# Patient Record
Sex: FEMALE
[De-identification: names, ages, dates, MRNs, and addresses within clinical notes are randomized per-mention and may not be internally consistent; named-entity substitution may affect disease eponyms.]

---

## 2017-11-17 ENCOUNTER — HOSPITAL ENCOUNTER (EMERGENCY)
Dept: HOSPITAL 31 - C.ER | Age: 32
Discharge: HOME | End: 2017-11-17
Payer: MEDICAID

## 2017-11-17 VITALS — TEMPERATURE: 97.4 F | HEART RATE: 69 BPM | SYSTOLIC BLOOD PRESSURE: 110 MMHG | DIASTOLIC BLOOD PRESSURE: 74 MMHG

## 2017-11-17 VITALS — RESPIRATION RATE: 18 BRPM

## 2017-11-17 VITALS — OXYGEN SATURATION: 100 %

## 2017-11-17 DIAGNOSIS — R10.30: ICD-10-CM

## 2017-11-17 DIAGNOSIS — I10: ICD-10-CM

## 2017-11-17 DIAGNOSIS — R19.7: Primary | ICD-10-CM

## 2017-11-17 LAB
ALBUMIN/GLOB SERPL: 1.6 {RATIO} (ref 1–2.1)
ALP SERPL-CCNC: 58 U/L (ref 38–126)
ALT SERPL-CCNC: 41 U/L (ref 9–52)
AST SERPL-CCNC: 28 U/L (ref 14–36)
BASOPHILS # BLD AUTO: 0 K/UL (ref 0–0.2)
BASOPHILS NFR BLD: 0.3 % (ref 0–2)
BILIRUB SERPL-MCNC: 0.8 MG/DL (ref 0.2–1.3)
BILIRUB UR-MCNC: NEGATIVE MG/DL
BUN SERPL-MCNC: 10 MG/DL (ref 7–17)
CALCIUM SERPL-MCNC: 8.3 MG/DL (ref 8.6–10.4)
CHLORIDE SERPL-SCNC: 103 MMOL/L (ref 98–107)
CO2 SERPL-SCNC: 25 MMOL/L (ref 22–30)
EOSINOPHIL # BLD AUTO: 0.1 K/UL (ref 0–0.7)
EOSINOPHIL NFR BLD: 1.8 % (ref 0–4)
ERYTHROCYTE [DISTWIDTH] IN BLOOD BY AUTOMATED COUNT: 13.5 % (ref 11.5–14.5)
GLOBULIN SER-MCNC: 2.5 GM/DL (ref 2.2–3.9)
GLUCOSE SERPL-MCNC: 78 MG/DL (ref 65–105)
GLUCOSE UR STRIP-MCNC: NORMAL MG/DL
HCT VFR BLD CALC: 39.4 % (ref 34–47)
KETONES UR STRIP-MCNC: NEGATIVE MG/DL
LEUKOCYTE ESTERASE UR-ACNC: (no result) LEU/UL
LYMPHOCYTES # BLD AUTO: 1.8 K/UL (ref 1–4.3)
LYMPHOCYTES NFR BLD AUTO: 31.8 % (ref 20–40)
MCH RBC QN AUTO: 29 PG (ref 27–31)
MCHC RBC AUTO-ENTMCNC: 33.6 G/DL (ref 33–37)
MCV RBC AUTO: 86.3 FL (ref 81–99)
MONOCYTES # BLD: 0.4 K/UL (ref 0–0.8)
MONOCYTES NFR BLD: 6.8 % (ref 0–10)
NRBC BLD AUTO-RTO: 0.1 % (ref 0–2)
PH UR STRIP: 5 [PH] (ref 5–8)
PLATELET # BLD: 280 K/UL (ref 130–400)
PMV BLD AUTO: 9.2 FL (ref 7.2–11.7)
POTASSIUM SERPL-SCNC: 4 MMOL/L (ref 3.6–5.2)
PROT SERPL-MCNC: 6.5 G/DL (ref 6.3–8.3)
PROT UR STRIP-MCNC: NEGATIVE MG/DL
RBC # UR STRIP: NEGATIVE /UL
RBC #/AREA URNS HPF: < 1 /HPF (ref 0–3)
SODIUM SERPL-SCNC: 137 MMOL/L (ref 132–148)
SP GR UR STRIP: 1.02 (ref 1–1.03)
UROBILINOGEN UR-MCNC: NORMAL MG/DL (ref 0.2–1)
WBC # BLD AUTO: 5.6 K/UL (ref 4.8–10.8)
WBC #/AREA URNS HPF: < 1 /HPF (ref 0–5)

## 2017-11-17 PROCEDURE — 96375 TX/PRO/DX INJ NEW DRUG ADDON: CPT

## 2017-11-17 PROCEDURE — 96374 THER/PROPH/DIAG INJ IV PUSH: CPT

## 2017-11-17 PROCEDURE — 83690 ASSAY OF LIPASE: CPT

## 2017-11-17 PROCEDURE — 99285 EMERGENCY DEPT VISIT HI MDM: CPT

## 2017-11-17 PROCEDURE — 85025 COMPLETE CBC W/AUTO DIFF WBC: CPT

## 2017-11-17 PROCEDURE — 81001 URINALYSIS AUTO W/SCOPE: CPT

## 2017-11-17 PROCEDURE — 80053 COMPREHEN METABOLIC PANEL: CPT

## 2017-11-17 PROCEDURE — 96361 HYDRATE IV INFUSION ADD-ON: CPT

## 2017-11-17 PROCEDURE — 84703 CHORIONIC GONADOTROPIN ASSAY: CPT

## 2017-11-17 NOTE — C.PDOC
History Of Present Illness





33 y/o female presents to ED with c/o lower abdominal pain, which she states 

radiates to the back, and described as cramping. Patient reports pain is 

associated with diarrhea. She reports she is having several episodes of non-

bloody diarrhea per day, and has diarrhea after she eats. Denies fever, vomiting

, dysuria, rash, recent travel. 


Time Seen by Provider: 11/17/17 09:28


Chief Complaint (Nursing): Abdominal Pain


History Per: Patient


History/Exam Limitations: no limitations


Onset/Duration Of Symptoms: Days


Current Symptoms Are (Timing): Still Present


Location Of Pain/Discomfort: RLQ, LLQ


Radiation Of Pain To:: None


Quality Of Discomfort: "Pain"


Associated Symptoms: Diarrhea.  denies: Fever, Chills, Vomiting, Urinary 

Symptoms


Recent travel outside of the [place]: No





Past Medical History


Reviewed: Historical Data, Nursing Documentation, Vital Signs


Vital Signs: 


 Last Vital Signs











Temp  97.9 F   11/17/17 12:26


 


Pulse  63   11/17/17 12:26


 


Resp  18   11/17/17 12:26


 


BP  96/68 L  11/17/17 12:26


 


Pulse Ox  100   11/17/17 12:26














- Medical History


PMH: HTN


Family History: States: Unknown Family Hx





- Social History


Hx Tobacco Use: No


Hx Alcohol Use: No


Hx Substance Use: No





- Immunization History


Hx Tetanus Toxoid Vaccination: No


Hx Influenza Vaccination: No





Review Of Systems


Except As Marked, All Systems Reviewed And Found Negative.


Constitutional: Negative for: Fever


Cardiovascular: Negative for: Chest Pain, Palpitations


Respiratory: Negative for: Cough, Shortness of Breath, Wheezing


Gastrointestinal: Positive for: Abdominal Pain, Diarrhea.  Negative for: 

Vomiting


Skin: Negative for: Rash


Neurological: Negative for: Headache, Dizziness





Physical Exam





- Physical Exam


Appears: Non-toxic, No Acute Distress


Skin: Normal Color, Warm, Dry


Head: Atraumatic, Normacephalic


Oral Mucosa: Moist


Chest: Symmetrical


Cardiovascular: Rhythm Regular


Respiratory: Normal Breath Sounds, No Rales, No Rhonchi, No Wheezing


Gastrointestinal/Abdominal: Soft, No Tenderness, No Guarding, No Rebound


Back: Normal Inspection, No CVA Tenderness


Extremity: Normal ROM, Capillary Refill (< 2 sec.)


Neurological/Psych: Oriented x3, Normal Speech, Normal Cognition





ED Course And Treatment





- Laboratory Results


Result Diagrams: 


 11/17/17 10:27





 11/17/17 10:27


O2 Sat by Pulse Oximetry: 100 (RA)


Pulse Ox Interpretation: Normal


Progress Note: Pepcid, Toradol, Zofran, and IVFs. Labs ordered.





Disposition





- Disposition


Referrals: 


Dawood Crowe MD [Staff Provider] - 


Disposition: HOME/ ROUTINE


Disposition Time: 12:57


Condition: GOOD


Additional Instructions: 


Follow up with the medical doctor within 1-2 days. Return if worsened. 


Prescriptions: 


Ciprofloxacin HCl [Cipro] 500 mg PO BID #19 tab


Ibuprofen [Motrin] 1 tab PO TID PRN #30 tab


 PRN Reason: Pain


metroNIDAZOLE [Flagyl] 500 mg PO BID #14 tab


Instructions:  Acute Diarrhea (ED)


Forms:  [facility] (English)





- Clinical Impression


Clinical Impression: 


 Diarrhea, Abdominal pain








- PA / NP / Resident Statement


MD/DO has reviewed & agrees with the documentation as recorded.





- Scribe Statement


The provider has reviewed the documentation as recorded by the Scribe





SM





All medical record entries made by the Scribe were at my direction and 

personally dictated by me. I have reviewed the chart and agree that the record 

accurately reflects my personal performance of the history, physical exam, 

medical decision making, and the department course for this patient. I have 

also personally directed, reviewed, and agree with the discharge instructions 

and disposition.

## 2018-04-16 ENCOUNTER — HOSPITAL ENCOUNTER (EMERGENCY)
Dept: HOSPITAL 31 - C.ER | Age: 33
Discharge: HOME | End: 2018-04-16
Payer: SELF-PAY

## 2018-04-16 VITALS
DIASTOLIC BLOOD PRESSURE: 78 MMHG | TEMPERATURE: 98.2 F | HEART RATE: 80 BPM | SYSTOLIC BLOOD PRESSURE: 124 MMHG | RESPIRATION RATE: 18 BRPM

## 2018-04-16 VITALS — BODY MASS INDEX: 31.1 KG/M2

## 2018-04-16 VITALS — OXYGEN SATURATION: 100 %
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
## 2018-04-16 DIAGNOSIS — S93.505A: Primary | ICD-10-CM

## 2018-04-16 NOTE — C.PDOC
History Of Present Illness


31 y/o female presents to ED with complaints of pain and swelling to left pinky 

toe after hitting toe on furniture 4 days ago. Patient denies numbness, fever, 

draining, nausea, vomiting or any other complaints at this time.


Time Seen by Provider: 04/16/18 10:34


Chief Complaint (Nursing): Lower Extremity Problem/Injury


History Per: Patient


History/Exam Limitations: no limitations


Onset/Duration Of Symptoms: Days


Current Symptoms Are (Timing): Still Present





Past Medical History


Reviewed: Historical Data, Nursing Documentation, Vital Signs


Vital Signs: 


 Last Vital Signs











Temp  98.2 F   04/16/18 12:04


 


Pulse  80   04/16/18 12:04


 


Resp  18   04/16/18 12:04


 


BP  124/78   04/16/18 12:04


 


Pulse Ox  100   04/16/18 12:04














- Medical History


PMH: HTN


Surgical History: No Surg Hx


Family History: States: No Known Family Hx





- Social History


Hx Tobacco Use: No


Hx Alcohol Use: No


Hx Substance Use: No





- Immunization History


Hx Tetanus Toxoid Vaccination: No


Hx Influenza Vaccination: No


Hx Pneumococcal Vaccination: No





Review Of Systems


Except As Marked, All Systems Reviewed And Found Negative.


Musculoskeletal: Positive for: Foot Pain


Skin: Negative for: Rash


Neurological: Negative for: Weakness, Numbness





Physical Exam





- Physical Exam


Appears: Non-toxic, Other (In mild pain)


Skin: Warm, Dry


Head: Atraumatic


Eye(s): bilateral: Normal Inspection


Oral Mucosa: Moist


Extremity: Tenderness (left 5th digit tender to palpation ), Capillary Refill (<

2 seconds), No Deformity, Swelling (left 5th digit mildly), Other (Erythema to 

left 5th digit )


Neurological/Psych: Oriented x3, Normal Speech, Normal Cognition





ED Course And Treatment


O2 Sat by Pulse Oximetry: 100 (RA)


Pulse Ox Interpretation: Normal


Progress Note: Naproxen administered.  Left 5th digit xray- Questionable chip 

fracture. No dislocation.  Toe taped , pt discharged with f.u to ortho





Disposition


Counseled Patient/Family Regarding: Diagnosis, Need For Followup





- Disposition


Referrals: 


Podiatry Clinic [Outside]


Disposition: HOME/ ROUTINE


Disposition Time: 11:45


Condition: STABLE


Additional Instructions: 


FOLLOW UP WITH PODIATRY WITHIN 1 WEEK





USE MEDICATION FOR PAIN AS NEEDED





ELEVATE FOOT AS MUCH AS POSSIBLE





RETURN TO ER IF SYMPTOMS WORSEN 


Prescriptions: 


Naproxen 375 mg PO BID PRN #20 tablet


 PRN Reason: pain


Instructions:  Toe Injury (DC)


Forms:  [facility] (English), Work Excuse


Print Language: ENGLISH





- POA


Present On Arrival: Falls Or Trauma





- Clinical Impression


Clinical Impression: 


 Sprain of fifth toe, left








- Scribe Statement


The provider has reviewed the documentation as recorded by the Scribvidal Zurita





All medical record entries made by the Martinezibvidal were at my direction and 

personally dictated by me. I have reviewed the chart and agree that the record 

accurately reflects my personal performance of the history, physical exam, 

medical decision making, and the department course for this patient. I have 

also personally directed, reviewed, and agree with the discharge instructions 

and disposition.

## 2018-04-16 NOTE — RAD
PROCEDURE:  Left foot 04/16/2018 



HISTORY:

Left toe pain after injury.  Rule out fracture.



COMPARISON:

Made with left foot 08/24/2015



FINDINGS:



BONES:

There is a small semi lunar shaped osseous density within the soft 

tissues adjacent to the distal phalanx 5th toe that could represent 

old posttraumatic mineralization versus tiny avulsion fracture. The 

remaining osseous structures intact. 



JOINTS:

Normal. 



SOFT TISSUES:

Normal. 



OTHER FINDINGS:

None.



IMPRESSION:

There is a small semi lunar shaped osseous density within the soft 

tissues adjacent to the distal phalanx 5th toe that could represent 

old posttraumatic mineralization versus tiny avulsion fracture. The 

remaining osseous structures intact.